# Patient Record
Sex: FEMALE | Race: WHITE | HISPANIC OR LATINO | URBAN - METROPOLITAN AREA
[De-identification: names, ages, dates, MRNs, and addresses within clinical notes are randomized per-mention and may not be internally consistent; named-entity substitution may affect disease eponyms.]

---

## 2017-05-24 ENCOUNTER — IMPORTED ENCOUNTER (OUTPATIENT)
Dept: URBAN - METROPOLITAN AREA CLINIC 50 | Facility: CLINIC | Age: 82
End: 2017-05-24

## 2017-07-21 ENCOUNTER — IMPORTED ENCOUNTER (OUTPATIENT)
Dept: URBAN - METROPOLITAN AREA CLINIC 50 | Facility: CLINIC | Age: 82
End: 2017-07-21

## 2018-02-20 ENCOUNTER — IMPORTED ENCOUNTER (OUTPATIENT)
Dept: URBAN - METROPOLITAN AREA CLINIC 50 | Facility: CLINIC | Age: 83
End: 2018-02-20

## 2018-02-26 ENCOUNTER — IMPORTED ENCOUNTER (OUTPATIENT)
Dept: URBAN - METROPOLITAN AREA CLINIC 50 | Facility: CLINIC | Age: 83
End: 2018-02-26

## 2019-02-26 ENCOUNTER — IMPORTED ENCOUNTER (OUTPATIENT)
Dept: URBAN - METROPOLITAN AREA CLINIC 50 | Facility: CLINIC | Age: 84
End: 2019-02-26

## 2019-08-09 ENCOUNTER — IMPORTED ENCOUNTER (OUTPATIENT)
Dept: URBAN - METROPOLITAN AREA CLINIC 50 | Facility: CLINIC | Age: 84
End: 2019-08-09

## 2019-08-09 NOTE — PATIENT DISCUSSION
"""Continue Azopt right eye twice a day ."" ""Continue Latanoprost right eye at bedtime . "" ""Continue Brimonidine 0.2% right eye twice a day . """

## 2019-08-14 ENCOUNTER — IMPORTED ENCOUNTER (OUTPATIENT)
Dept: URBAN - METROPOLITAN AREA CLINIC 50 | Facility: CLINIC | Age: 84
End: 2019-08-14

## 2019-08-15 ENCOUNTER — IMPORTED ENCOUNTER (OUTPATIENT)
Dept: URBAN - METROPOLITAN AREA CLINIC 50 | Facility: CLINIC | Age: 84
End: 2019-08-15

## 2019-08-21 ENCOUNTER — IMPORTED ENCOUNTER (OUTPATIENT)
Dept: URBAN - METROPOLITAN AREA CLINIC 50 | Facility: CLINIC | Age: 84
End: 2019-08-21

## 2019-08-21 NOTE — PATIENT DISCUSSION
"""Patient was in our office today for the completion of a MNCL fit. Refracted patient at 9:24am on 8/21/19. She was down to 20/40-1. We will schedule her in 3 weeks to allow time to receive the lens.  Dispense scheduled for 9/19/19."""

## 2019-08-21 NOTE — PATIENT DISCUSSION
"""Patient was in our office today for the completion of a MNCL fit. 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 Discussed the goal of the lens with the patient to help clear up her vision by helping to reshape the cornea while wearing the lens. Patient agreed to the cost if insurance denies her MNCL claim. Refracted patient at 9:24am on 8/21/19. She was down to 20/40-1. We will schedule her in 3 weeks to allow time to receive the lens.  Dispense scheduled for 9/19/19."""

## 2019-09-06 ENCOUNTER — IMPORTED ENCOUNTER (OUTPATIENT)
Dept: URBAN - METROPOLITAN AREA CLINIC 50 | Facility: CLINIC | Age: 84
End: 2019-09-06

## 2019-09-09 ENCOUNTER — IMPORTED ENCOUNTER (OUTPATIENT)
Dept: URBAN - METROPOLITAN AREA CLINIC 50 | Facility: CLINIC | Age: 84
End: 2019-09-09

## 2019-09-09 NOTE — PATIENT DISCUSSION
"""Patient was in office today for a MNCL dispense. Was advised the lens is $183. Patient was used to SCL prior to this so she was experiencing lens awareness at first. Lens is dropping quickly between blinks.  After settling baring central/inferior and from 11:00-2:00 superior

## 2019-09-23 ENCOUNTER — IMPORTED ENCOUNTER (OUTPATIENT)
Dept: URBAN - METROPOLITAN AREA CLINIC 50 | Facility: CLINIC | Age: 84
End: 2019-09-23

## 2019-09-23 NOTE — PATIENT DISCUSSION
"""Patient presented in office today for an RGP dispense. Will need to send back the lens and reorder a new lens for the patient. She has two RK scars that are causing pooling in the lens.  She is visually improving."""

## 2019-10-04 ENCOUNTER — IMPORTED ENCOUNTER (OUTPATIENT)
Dept: URBAN - METROPOLITAN AREA CLINIC 50 | Facility: CLINIC | Age: 84
End: 2019-10-04

## 2019-10-16 ENCOUNTER — IMPORTED ENCOUNTER (OUTPATIENT)
Dept: URBAN - METROPOLITAN AREA CLINIC 50 | Facility: CLINIC | Age: 84
End: 2019-10-16

## 2019-10-16 NOTE — PATIENT DISCUSSION
"""Discussed with patient the objective of this current lens. She is comfortable in the lens. Advised patient to try and wear it the next couple of days to see if she would like to continue to wearing the lens. Advised patient if she has any irritation or severe decrease in vision to give our office a call. Trials given to patient today. Patient to call with progress to finalize prescription.  Instructed patient to discontinue contact lens wear and call office immediately if pain/discomfort

## 2019-10-18 ENCOUNTER — IMPORTED ENCOUNTER (OUTPATIENT)
Dept: URBAN - METROPOLITAN AREA CLINIC 50 | Facility: CLINIC | Age: 84
End: 2019-10-18

## 2019-11-06 ENCOUNTER — IMPORTED ENCOUNTER (OUTPATIENT)
Dept: URBAN - METROPOLITAN AREA CLINIC 50 | Facility: CLINIC | Age: 84
End: 2019-11-06

## 2020-02-24 ENCOUNTER — IMPORTED ENCOUNTER (OUTPATIENT)
Dept: URBAN - METROPOLITAN AREA CLINIC 50 | Facility: CLINIC | Age: 85
End: 2020-02-24

## 2020-03-10 ENCOUNTER — IMPORTED ENCOUNTER (OUTPATIENT)
Dept: URBAN - METROPOLITAN AREA CLINIC 50 | Facility: CLINIC | Age: 85
End: 2020-03-10

## 2020-06-02 ENCOUNTER — IMPORTED ENCOUNTER (OUTPATIENT)
Dept: URBAN - METROPOLITAN AREA CLINIC 50 | Facility: CLINIC | Age: 85
End: 2020-06-02

## 2020-06-05 ENCOUNTER — IMPORTED ENCOUNTER (OUTPATIENT)
Dept: URBAN - METROPOLITAN AREA CLINIC 50 | Facility: CLINIC | Age: 85
End: 2020-06-05

## 2020-06-05 NOTE — PATIENT DISCUSSION
"""Possilble open incision of RK incision.  Start Prolensa right eye four times a day and Besivance ""

## 2020-06-09 ENCOUNTER — IMPORTED ENCOUNTER (OUTPATIENT)
Dept: URBAN - METROPOLITAN AREA CLINIC 50 | Facility: CLINIC | Age: 85
End: 2020-06-09

## 2020-06-30 ENCOUNTER — IMPORTED ENCOUNTER (OUTPATIENT)
Dept: URBAN - METROPOLITAN AREA CLINIC 50 | Facility: CLINIC | Age: 85
End: 2020-06-30

## 2020-06-30 NOTE — PATIENT DISCUSSION
"""Better. Continue Prolensa right eye once a day. D/C Besivance right eye twice a day.  Continue ""

## 2021-03-02 ENCOUNTER — IMPORTED ENCOUNTER (OUTPATIENT)
Dept: URBAN - METROPOLITAN AREA CLINIC 50 | Facility: CLINIC | Age: 86
End: 2021-03-02

## 2021-04-17 ASSESSMENT — TONOMETRY
OD_IOP_MMHG: 15
OD_IOP_MMHG: 16
OD_IOP_MMHG: 17
OD_IOP_MMHG: 10
OD_IOP_MMHG: 18
OD_IOP_MMHG: 11
OD_IOP_MMHG: 19
OD_IOP_MMHG: 15
OD_IOP_MMHG: 16
OD_IOP_MMHG: 14
OD_IOP_MMHG: 12

## 2021-04-17 ASSESSMENT — PACHYMETRY
OD_CT_UM: 527

## 2021-04-17 ASSESSMENT — VISUAL ACUITY
OD_SC: 20/50-2
OD_PH: @ 13 IN
OD_CC: 20/200+2
OD_SC: 20/60
OD_SC: 20/60+1
OD_SC: 20/70-2
OD_SC: 20/40-2
OS_CC: J3
OD_CC: 20/100
OD_CC: 20/80
OD_CC: 20/400
OD_CC: 20/200
OD_PH: 20/60
OD_SC: 20/50
OD_CC: J3